# Patient Record
Sex: FEMALE | Race: OTHER | Employment: FULL TIME | URBAN - METROPOLITAN AREA
[De-identification: names, ages, dates, MRNs, and addresses within clinical notes are randomized per-mention and may not be internally consistent; named-entity substitution may affect disease eponyms.]

---

## 2018-05-11 ENCOUNTER — HOSPITAL ENCOUNTER (OUTPATIENT)
Dept: LAB | Age: 38
Discharge: HOME | End: 2018-05-11

## 2018-05-11 ENCOUNTER — OFFICE VISIT (OUTPATIENT)
Dept: FAMILY MEDICINE CLINIC | Age: 38
End: 2018-05-11

## 2018-05-11 VITALS
HEART RATE: 73 BPM | DIASTOLIC BLOOD PRESSURE: 82 MMHG | WEIGHT: 127 LBS | SYSTOLIC BLOOD PRESSURE: 112 MMHG | TEMPERATURE: 98.9 F | HEIGHT: 61 IN | BODY MASS INDEX: 23.98 KG/M2

## 2018-05-11 DIAGNOSIS — N89.8 VAGINAL ITCHING: ICD-10-CM

## 2018-05-11 DIAGNOSIS — R42 DIZZINESS: ICD-10-CM

## 2018-05-11 DIAGNOSIS — Z13.9 ENCOUNTER FOR SCREENING: Primary | ICD-10-CM

## 2018-05-11 LAB
ALBUMIN SERPL-MCNC: 4.1 G/DL (ref 3.5–5)
ALBUMIN/GLOB SERPL: 1.1 {RATIO} (ref 1.1–2.2)
ALP SERPL-CCNC: 75 U/L (ref 45–117)
ALT SERPL-CCNC: 49 U/L (ref 12–78)
ANION GAP SERPL CALC-SCNC: 6 MMOL/L (ref 5–15)
AST SERPL-CCNC: 27 U/L (ref 15–37)
BILIRUB SERPL-MCNC: 0.2 MG/DL (ref 0.2–1)
BUN SERPL-MCNC: 8 MG/DL (ref 6–20)
BUN/CREAT SERPL: 12 (ref 12–20)
CALCIUM SERPL-MCNC: 9.3 MG/DL (ref 8.5–10.1)
CHLORIDE SERPL-SCNC: 108 MMOL/L (ref 97–108)
CO2 SERPL-SCNC: 28 MMOL/L (ref 21–32)
CREAT SERPL-MCNC: 0.69 MG/DL (ref 0.55–1.02)
ERYTHROCYTE [DISTWIDTH] IN BLOOD BY AUTOMATED COUNT: 13 % (ref 11.5–14.5)
EST. AVERAGE GLUCOSE BLD GHB EST-MCNC: 105 MG/DL
GLOBULIN SER CALC-MCNC: 3.7 G/DL (ref 2–4)
GLUCOSE SERPL-MCNC: 84 MG/DL (ref 65–100)
HBA1C MFR BLD: 5.3 % (ref 4.2–6.3)
HCT VFR BLD AUTO: 43 % (ref 35–47)
HGB BLD-MCNC: 13.3 G/DL
HGB BLD-MCNC: 13.6 G/DL (ref 11.5–16)
MCH RBC QN AUTO: 28.4 PG (ref 26–34)
MCHC RBC AUTO-ENTMCNC: 31.6 G/DL (ref 30–36.5)
MCV RBC AUTO: 89.8 FL (ref 80–99)
NRBC # BLD: 0 K/UL (ref 0–0.01)
NRBC BLD-RTO: 0 PER 100 WBC
PLATELET # BLD AUTO: 326 K/UL (ref 150–400)
PMV BLD AUTO: 9 FL (ref 8.9–12.9)
POTASSIUM SERPL-SCNC: 4.4 MMOL/L (ref 3.5–5.1)
PROT SERPL-MCNC: 7.8 G/DL (ref 6.4–8.2)
RBC # BLD AUTO: 4.79 M/UL (ref 3.8–5.2)
SODIUM SERPL-SCNC: 142 MMOL/L (ref 136–145)
T4 FREE SERPL-MCNC: 0.9 NG/DL (ref 0.8–1.5)
TSH SERPL DL<=0.05 MIU/L-ACNC: 2.06 UIU/ML (ref 0.36–3.74)
WBC # BLD AUTO: 3.7 K/UL (ref 3.6–11)

## 2018-05-11 PROCEDURE — 84443 ASSAY THYROID STIM HORMONE: CPT | Performed by: NURSE PRACTITIONER

## 2018-05-11 PROCEDURE — 83036 HEMOGLOBIN GLYCOSYLATED A1C: CPT | Performed by: NURSE PRACTITIONER

## 2018-05-11 PROCEDURE — 84439 ASSAY OF FREE THYROXINE: CPT | Performed by: NURSE PRACTITIONER

## 2018-05-11 PROCEDURE — 85027 COMPLETE CBC AUTOMATED: CPT | Performed by: NURSE PRACTITIONER

## 2018-05-11 PROCEDURE — 80053 COMPREHEN METABOLIC PANEL: CPT | Performed by: NURSE PRACTITIONER

## 2018-05-11 RX ORDER — FLUCONAZOLE 150 MG/1
TABLET ORAL
Qty: 3 TAB | Refills: 0 | Status: SHIPPED | OUTPATIENT
Start: 2018-05-11 | End: 2018-07-02 | Stop reason: ALTCHOICE

## 2018-05-11 NOTE — PATIENT INSTRUCTIONS
Mareos: Instrucciones de cuidado - [ Dizziness: Care Instructions ]  Instrucciones de cuidado  Los mareos son [place] sensación de inestabilidad o confusión en la philip. Son distintos al vértigo, love sensación de que la habitación gira o de que usted se mueve o . También es distinto del aturdimiento, que es la sensación de que está a punto de desmayarse. Puede resultar difícil conocer la causa de los Jack. Algunas personas se sienten mareadas cuando tienen migrañas. A veces, los episodios gripales pueden hacer que se sienta mareado. Algunas afecciones médicas, jose los problemas cardíacos o la presión arterial amy, pueden hacer que se sienta mareado. Muchos medicamentos pueden causar mareos, jose los que se usan para la presión arterial amy, el dolor o la ansiedad. Si es un medicamento el que está causando los síntomas, stack médico podría recomendarle que lo cambie o deje de tomarlo. Si es un problema cardíaco, podría necesitar medicamentos para ayudar a que stack corazón funcione mejor. Si no hay razón aparente para los síntomas, stack médico podría sugerir vigilar y esperar odilia un tiempo para ashli si los mareos desaparecen por sí solos. La atención de seguimiento es love parte clave de stack tratamiento y seguridad. Asegúrese de hacer y acudir a todas las citas, y llame a stack médico si está teniendo problemas. También es love buena idea saber los resultados de los exámenes y mantener love lista de los medicamentos que noelle. ¿Cómo puede cuidarse en el hogar? · Si stack médico le recomienda o receta medicamentos, tómelos exactamente según las indicaciones. Llame a stack médico si elizabeth estar teniendo un problema con stack medicamento. · No conduzca mientras se sienta mareado. · Trate de prevenir las caídas. Algunas medidas que puede ning son:  Felipe Demark Usar tapetes antideslizantes, agregar agarraderas cerca de la rick y usar luces nocturnas.   ¨ Ordenar stack casa de margarito manera que en los senderos no haya nada con lo que se pueda tropezar. ¨ Avisarles a familiares y [facility] que se ha estado sintiendo [place]. [place] les servirá para saber cómo ayudarle. ¿Cuándo debe pedir ayuda? Llame al 911 en cualquier momento que considere que necesita atención de [place]. Por ejemplo, llame si:  ? · Se desmayó (perdió el conocimiento). ? · Tiene mareos junto con síntomas de un ataque cardíaco. Estos pueden incluir:  ¨ Dolor de pecho, o presión o love sensación extraña en el pecho. ¨ Sudoración. ¨ Falta de aire. ¨ Náuseas o vómito. ¨ Dolor, presión, o love sensación extraña en la espalda, el terry, la mandíbula o el abdomen superior, o en pro o ambos hombros o brazos. ¨ Aturdimiento o debilidad repentina. ¨ Un latido cardíaco rápido o irregular. ? · Tiene síntomas de un ataque cerebral. Estos pueden incluir:  ¨ Entumecimiento, hormigueo, debilidad o parálisis repentinos en la jazmin, el brazo o la pierna, sobre todo si ocurre en un solo lado del cuerpo. ¨ Cambios súbitos en la vista. ¨ Problemas repentinos para hablar. ¨ Confusión súbita o dificultad repentina para comprender frases sencillas. ¨ Problemas repentinos para caminar o mantener el equilibrio. ¨ Un dolor de philip intenso y repentino, distinto a los jyotsna de philip anteriores. ?Llame a stack médico ahora mismo o busque atención médica inmediata si:  ? · Se siente mareado y tiene Dionocłmelissa, hedy de Jah o zumbido [employer] oídos. ? · Tiene nuevas náuseas y vómito o [address] ? · Pricila mareos no desaparecen o regresan. ?Preste especial atención a los cambios en stack andrew y asegúrese de comunicarse con stack médico si:  ? · No mejora jose se esperaba. ¿Dónde puede encontrar más información en inglés? Eddie Edward a http://obdulio-yannick.info/. [place] Spotted [id number] en la búsqueda para aprender más acerca de \"Mareos: Instrucciones de cuidado - [ Dizziness: Care Instructions ]. \"  Revisado: 20 Leona Romero 2017  Versión del contenido: 11.4  © 4479-7837 Healthwise, Incorporated.  Robles Trimble instrucciones de cuidado fueron adaptadas bajo licencia por Good [facility] Connections (which disclaims liability or warranty for this information). Si usted tiene [place] afección médica o sobre estas instrucciones, siempre pregunte a stack profesional de andrew. [facility] niega toda garantía o responsabilidad por stack uso de esta información.

## 2018-05-11 NOTE — PROGRESS NOTES
Printed AVS, provided to pt and reviewed. Pt indicated understanding and had no questions. Told pt that rx's have been sent to pharmacy and they should be ready for  in approximately 2 hrs. Reviewed the medication ordered today with the pt. Charla Rosenthal was the . Referred the pt to the labs for blood work. Pt instructed to return to the clinic in 6-8 weeks.  Otf Mary RN

## 2018-05-11 NOTE — PROGRESS NOTES
Subjective:     Chief Complaint   Patient presents with    Dizziness    Skin Problem        She  is a 30 y.o. female who presents for evaluation of dizziness. Onset since birth of son approx 3 months ago. Pt is not currently breastfeeding. Is taking PNV and centrum daily MV.     S&S is intermittent, usually 3-4x week, lasting only a few min and then are gone. No relationship to positioning/activity, though they do get worse w/ bending over. Also c/o of vaginal ext irration w/ some white discharge. Onset approx 1 month ago. Poor relief from OTC vaginal products. prutitus gets worse prior to and during her menses. No BC, surg sterilized since birth of child. Had some prior dizziness before and was told by provider it was r/t her cholesterol. PMH: denies     Surg:  x 1    NKDA     Current Rx/supplements: noted above    Social:  Pt denies tobacco, etoh nor drug use. 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 Pt is not currently  nor has partner. Has 3 other child + infant son. Family Hx:  Denies           ROS  Gen - no fever/chills  Resp - no dyspnea or cough  CV - no chest pain or SALAZAR  Rest per HPI    No past medical history on file. No past surgical history on file. No current outpatient prescriptions on file prior to visit. No current facility-administered medications on file prior to visit.          Objective:     Vitals:    18 1404   BP: 112/82   Pulse: 73   Temp: 98.9 °F (37.2 °C)   TempSrc: Oral   Weight: 127 lb (57.6 kg)   Height: 5' 1.42\" (1.56 m)       Physical Examination:  General appearance - alert, well appearing, and in no distress  Eyes -sclera anicteric  Neck - supple, no significant adenopathy, no thyromegaly  Chest - clear to auscultation, no wheezes, rales or rhonchi, symmetric air entry  Heart - normal rate, regular rhythm, normal S1, S2, no murmurs, rubs, clicks or gallops  Neurological - alert, oriented, no focal findings or movement disorder noted  Abdomen-BS present/WNL x 4 quads, non-tender/distended, soft,no organomegaly    Assessment/ Plan:   Diagnoses and all orders for this visit:    1. Encounter for screening  -     AMB POC HEMOGLOBIN (HGB)    2. Vaginal itching  -     fluconazole (DIFLUCAN) 150 mg tablet; Take 1 tablet q3 days x 9 days. [place] 1 tableta cada 3 purvis por 9 purvis. 3. Dizziness  -     METABOLIC PANEL, COMPREHENSIVE; Future  -     HEMOGLOBIN A1C WITH EAG; Future  -     TSH 3RD GENERATION; Future  -     CBC W/O DIFF; Future  -     T4, FREE; Future       Vitals and exam unremarkable, likely r/t excess vitamin intake? Check baseline labs. Advised to stop both OTC vitamins. Diflucan q3 days x 3 doses for suspected candida, no sexual partners since birth of child. Re-eval S&S in 6-8 weeks. I have discussed the diagnosis with the patient and the intended plan as seen in the above orders. The patient has received an after-visit summary and questions were answered concerning future plans. I have discussed medication side effects and warnings with the patient as well. The patient verbalizes understanding and agreement with the plan.     Follow-up Disposition: Not on File

## 2018-07-02 ENCOUNTER — OFFICE VISIT (OUTPATIENT)
Dept: FAMILY MEDICINE CLINIC | Age: 38
End: 2018-07-02

## 2018-07-02 VITALS
BODY MASS INDEX: 23.11 KG/M2 | SYSTOLIC BLOOD PRESSURE: 115 MMHG | HEART RATE: 74 BPM | TEMPERATURE: 98.3 F | DIASTOLIC BLOOD PRESSURE: 80 MMHG | WEIGHT: 124 LBS

## 2018-07-02 DIAGNOSIS — R30.0 DYSURIA: ICD-10-CM

## 2018-07-02 DIAGNOSIS — M54.50 MIDLINE LOW BACK PAIN WITHOUT SCIATICA, UNSPECIFIED CHRONICITY: Primary | ICD-10-CM

## 2018-07-02 RX ORDER — CYCLOBENZAPRINE HCL 10 MG
10 TABLET ORAL
Qty: 30 TAB | Refills: 0 | Status: SHIPPED | OUTPATIENT
Start: 2018-07-02

## 2018-07-02 RX ORDER — DICLOFENAC SODIUM 75 MG/1
75 TABLET, DELAYED RELEASE ORAL
Qty: 40 TAB | Refills: 1 | Status: SHIPPED | OUTPATIENT
Start: 2018-07-02

## 2018-07-02 NOTE — PROGRESS NOTES
Coordination of Care  1. Have you been to the ER, urgent care clinic since your last visit? Hospitalized since your last visit? No    2. Have you seen or consulted any other health care providers outside of the [address] [place] [address] since your last visit? Include any pap smears or colon screening. No    Does the patient need refills? NO    Learning Assessment Complete?  yes

## 2018-07-02 NOTE — PROGRESS NOTES
Subjective:     Chief Complaint   Patient presents with    LOW BACK PAIN     x 3 weeks        She  is a 41 y.o. female who presents for evaluation of LBP x 3 weeks. No acute injury/fall/MVA prior to onset. Recalls waking up in AM w/ pain. No radiation into legs. Bilateral, sometimes more pronounced on L side. Since LOV, dizziness has improved. Reports it is usually worse first thing in AM, improves w/ activity. No attempted remedies/Tx. Has noted some dysuria x 2 weeks. No assoc pelvic pain nor menstrual changes. ROS  Gen - no fever/chills  Resp - no dyspnea or cough  CV - no chest pain or SALAZAR  Rest per HPI    No past medical history on file. No past surgical history on file. Current Outpatient Prescriptions on File Prior to Visit   Medication Sig Dispense Refill    fluconazole (DIFLUCAN) 150 mg tablet Take 1 tablet q3 days x 9 days. [place] 1 tableta cada 3 purvis por 9 purvis. 3 Tab 0     No current facility-administered medications on file prior to visit. Objective:     Vitals:    07/02/18 1334   BP: 115/80   Pulse: 74   Temp: 98.3 °F (36.8 °C)   TempSrc: Oral   Weight: 124 lb (56.2 kg)       Physical Examination:  General appearance - alert, well appearing, and in no distress  Eyes -sclera anicteric  Neck - supple, no significant adenopathy, no thyromegaly  Chest - clear to auscultation, no wheezes, rales or rhonchi, symmetric air entry  Heart - normal rate, regular rhythm, normal S1, S2, no murmurs, rubs, clicks or gallops  Neurological - alert, oriented, no focal findings or movement disorder noted    Neg SI tenderness and SLR, no palpable spinal abnormalities, gait WNL     Assessment/ Plan:   Diagnoses and all orders for this visit:    1. Midline low back pain without sciatica, unspecified chronicity  -     diclofenac EC (VOLTAREN) 75 mg EC tablet; Take 1 Tab by mouth nightly as needed. [place] 1 tableta por boca cada 12 horas por necesidad.   -     cyclobenzaprine (FLEXERIL) 10 mg tablet; Take 1 Tab by mouth nightly as needed for Muscle Spasm(s). [place] 1 tableta por boca cada noche por necisdad para espamos musculares. 2. Dysuria  -     CULTURE, URINE; Future  -     CHLAMYDIA/GC PCR; Future       Recommend Pt attempt trial of Rx nsaid, flexeril and back exercises for the next 4-6 weeks, RTC PRN if no improved by the middle/end of Aug.     Check urine Cx and g/c r/t dysuria. Declined work note. Advised her of emergency S&S warranted ED eval.     RTC PRN. I have discussed the diagnosis with the patient and the intended plan as seen in the above orders. The patient has received an after-visit summary and questions were answered concerning future plans. I have discussed medication side effects and warnings with the patient as well. The patient verbalizes understanding and agreement with the plan.     Follow-up Disposition: Not on File

## 2018-07-02 NOTE — PROGRESS NOTES
Reviewed AVS, prescription and pharmacy location with patient. Pt aware that she should RTC if s/s worsen to fail to improve. Patient verbalized understanding . No questions or concern from patient at this time.  Holden Schwartz RN

## 2018-07-02 NOTE — PATIENT INSTRUCTIONS
Dolor en la parte baja de la espalda (lumbalgia): Ejercicios - [ Low Back Pain: Exercises ]  Instrucciones de cuidado  Aquí se presentan algunos ejemplos de ejercicios típicos de rehabilitación para tratar stack afección. Empiece cada ejercicio lentamente. Reduzca la intensidad del ejercicio si Dierdre Ast a tener dolor. Stack médico o fisioterapeuta le dirán cuándo puede comenzar con estos ejercicios y cuáles funcionarán mejor para usted. Cómo hacer los ejercicios  [place]    1. Acuéstese boca abajo, apoyando stack cuerpo con los antebrazos. 2. Wallace presión con los codos en el piso para elevar la espalda. Al hacer esto, relaje los músculos del estómago y permita que stack espalda se arquee sin usar los músculos de la espalda. Al hacer presión, evite que las caderas o la pelvis se separen del piso. 3. Mantenga la posición de 15 a 30 segundos y luego relájese. 4. Repita de 2 a 4 veces. Ejercicio de alternar brazo y pierna (omid de caza)    1. Nota: Wallace ling ejercicio lentamente. Trate de mantener el cuerpo [employer], y no deje que love cadera caiga más abajo que la [place]. 2. Comience con las chayo y las rodillas en el piso. 3. Contraiga los músculos del abdomen. 4. Eleve love pierna del suelo y manténgala recta detrás de usted. Tenga cuidado de no dejar caer la cadera hacia abajo, porque eso hará que se le tuerza el tronco.  5. Mantenga la posición odilia unos 6 segundos y luego baje la pierna y alberta a la otra pierna. 6. Repita de 8 a 12 veces con cada pierna. 7. Con el tiempo, vaya aumentando [employer] de 10 a 30 segundos cada vez.  8. Si se siente estable y seguro con la pierna elevada, intente levantar el brazo opuesto directamente enfrente de usted al [employer]. Ejercicio de rodillas al pecho    1. Acuéstese boca arriba con las rodillas flexionadas y los pies apoyados sobre el piso.   2. Lleve love [place], manteniendo el otro pie apoyado en el suelo (o dejando la [place] pierna recta, jose lo sienta mejor en la parte baja de la espalda). 3. Mantenga la parte baja de la espalda presionada contra el suelo. Sosténgalo por lo menos de 15 a 30 segundos. 4. Relájese y regrese la rodilla a la posición inicial.  5. Repita el ejercicio con la otra pierna. Repita de 2 a 4 veces con cada pierna. 6. Para lograr mayor estiramiento, deje la otra pierna apoyada en el suelo mientras se asia la rodilla [place]. Abdominales    1. Acuéstese en el suelo boca arriba, con las rodillas dobladas en un ángulo de 90 grados. Los pies deben estar apoyados en el suelo, a unas 12 pulgadas (30 cm) de las nalgas (glúteos). 2. Cruce los [place]. Si esto le causa molestias en el terry, pruebe a poner las chayo detrás del terry (no de la philip), con los codos abiertos. 3. Contraiga lentamente los músculos del abdomen y eleve los omóplatos del suelo. 4. Mantenga la philip alineada con el cuerpo y no presione la barbilla hacia el pecho. 5. Sostenga esta posición por 1 o 2 segundos y después baje lentamente de nuevo hacia el suelo. 6. Repita de 8 a 12 veces. Ejercicio de inclinación de pelvis    1. Acuéstese de espalda con las rodillas flexionadas. 2. \"Tense\" stack estómago. [place] significa apretar los músculos contrayendo el ombligo e imaginando que se mueve hacia la columna vertebral. Deberá sentir jose si la espalda estuviera ejerciendo presión sobre el suelo y que las caderas y la pelvis se balancean hacia atrás. 3. Mantenga la posición odilia aproximadamente 6 segundos mientras respira suavemente. 4. Repita de 8 a 12 veces. [place] con el talón    1. Acuéstese boca arriba con ambas rodillas flexionadas y los tobillos doblados de manera que solo los talones estén sobre el piso. Las rodillas deben estar dobladas más o menos a 90 grados.   2. A continuación mert presión con los talones en el suelo, apriete las nalgas (glúteos) y levante las caderas del suelo hasta que los hombros, las caderas y las rodillas estén en línea recta. 3. Mantenga la posición odilia unos 6 segundos mientras sigue respirando normalmente, y luego baje lentamente las caderas hacia el piso y descanse por hasta 10 segundos. [address] 8 a 12 repeticiones. Estiramiento de isquiotibiales en el katharina de love jeovanny    1. Acuéstese boca arriba en el katharina de [place], con love pierna a través de la [place]. 2. Deslice la pierna hacia arriba por la pared, para enderezar la rodilla. Debe sentir un leve estiramiento en la parte posterior de la pierna. 3. Sostenga el estiramiento por lo menos de 15 a 30 segundos. No arquee la espalda, estire los dedos de los pies ni flexione las rodillas. Mantenga un talón tocando el suelo y el otro tocando la pared. 4. Repita con la otra pierna. 5. Repita de 2 a 4 veces con cada pierna. Estiramiento de los músculos flexores de la cadera    1. Póngase de rodillas en el suelo con love Rigoberto Pioche y Classie Bob atrás. Coloque la rodilla de adelante encima del pie. Mantenga la otra rodilla en contacto con el suelo. 2. Empuje lentamente la cadera hacia adelante hasta que sienta un estiramiento en la parte superior del muslo de la pierna que está atrás. 3. Sostenga el estiramiento por lo menos de 15 a 30 segundos. Repita con la otra pierna. 4. Repita de 2 a 4 veces a cada lado. [place] de pared    1. Párese con la espalda a entre 10 y 12 pulgadas (25 a 30 cm) de distancia de la pared. 2. Inclínese hacia la pared [employer] la espalda quede plana sobre stanislaw. 3. Deslícese lentamente hacia abajo hasta que las rodillas estén ligeramente flexionadas, presionando la parte baja de la espalda contra la pared. 4. Mantenga la posición odilia unos 6 segundos y después deslícese hacia arriba por la pared. 5. Repita de 8 a 12 veces. La atención de seguimiento es love parte clave de stack tratamiento y seguridad.  Asegúrese de hacer y acudir a todas las citas, y llame a stack médico si está teniendo problemas. También es love buena idea saber los resultados de los exámenes y mantener love lista de los medicamentos que noelle. ¿Dónde puede encontrar más información en inglés? Zara Motley a http://obdulio-yannick.info/. [place] Noelle X532 en la búsqueda para aprender más acerca de \"Dolor en la parte baja de la espalda (lumbalgia): Ejercicios - [ Low Back Pain: Exercises ]. \"  Revisado: 21 marzo, 2017  Versión del contenido: 11.4  © 4827-0600 Healthwise, Incorporated. Las instrucciones de cuidado fueron adaptadas bajo licencia por [employer] (which disclaims liability or warranty for this information). Si usted tiene [place] afección médica o sobre estas instrucciones, siempre pregunte a stack profesional de andrew. Healthwise, Incorporated niega toda garantía o responsabilidad por stack uso de esta información.

## 2019-08-27 ENCOUNTER — HOSPITAL ENCOUNTER (EMERGENCY)
Age: 39
Discharge: HOME | End: 2019-08-27
Attending: EMERGENCY MEDICINE
Payer: SELF-PAY

## 2019-08-27 ENCOUNTER — APPOINTMENT (OUTPATIENT)
Dept: GENERAL RADIOLOGY | Age: 39
End: 2019-08-27
Attending: EMERGENCY MEDICINE
Payer: SELF-PAY

## 2019-08-27 VITALS
HEIGHT: 61 IN | RESPIRATION RATE: 16 BRPM | SYSTOLIC BLOOD PRESSURE: 127 MMHG | TEMPERATURE: 98 F | WEIGHT: 125 LBS | DIASTOLIC BLOOD PRESSURE: 83 MMHG | HEART RATE: 67 BPM | OXYGEN SATURATION: 99 % | BODY MASS INDEX: 23.6 KG/M2

## 2019-08-27 DIAGNOSIS — R07.9 CHEST PAIN, UNSPECIFIED TYPE: Primary | ICD-10-CM

## 2019-08-27 DIAGNOSIS — M25.512 LEFT SHOULDER PAIN, UNSPECIFIED CHRONICITY: ICD-10-CM

## 2019-08-27 LAB
ALBUMIN SERPL-MCNC: 4.2 G/DL (ref 3.5–5)
ALBUMIN/GLOB SERPL: 1.1 {RATIO} (ref 1.1–2.2)
ALP SERPL-CCNC: 63 U/L (ref 45–117)
ALT SERPL-CCNC: 34 U/L (ref 12–78)
ANION GAP SERPL CALC-SCNC: 7 MMOL/L (ref 5–15)
APPEARANCE UR: CLEAR
AST SERPL-CCNC: 20 U/L (ref 15–37)
ATRIAL RATE: 70 BPM
BACTERIA URNS QL MICRO: NEGATIVE /HPF
BASOPHILS # BLD: 0 K/UL (ref 0–0.1)
BASOPHILS NFR BLD: 1 % (ref 0–1)
BILIRUB SERPL-MCNC: 0.4 MG/DL (ref 0.2–1)
BILIRUB UR QL: NEGATIVE
BUN SERPL-MCNC: 7 MG/DL (ref 6–20)
BUN/CREAT SERPL: 10 (ref 12–20)
CALCIUM SERPL-MCNC: 9.4 MG/DL (ref 8.5–10.1)
CALCULATED P AXIS, ECG09: 55 DEGREES
CALCULATED R AXIS, ECG10: 56 DEGREES
CALCULATED T AXIS, ECG11: 47 DEGREES
CHLORIDE SERPL-SCNC: 105 MMOL/L (ref 97–108)
CO2 SERPL-SCNC: 27 MMOL/L (ref 21–32)
COLOR UR: ABNORMAL
COMMENT, HOLDF: NORMAL
CREAT SERPL-MCNC: 0.69 MG/DL (ref 0.55–1.02)
DIAGNOSIS, 93000: NORMAL
DIFFERENTIAL METHOD BLD: NORMAL
EOSINOPHIL # BLD: 0.2 K/UL (ref 0–0.4)
EOSINOPHIL NFR BLD: 3 % (ref 0–7)
EPITH CASTS URNS QL MICRO: ABNORMAL /LPF
ERYTHROCYTE [DISTWIDTH] IN BLOOD BY AUTOMATED COUNT: 12.8 % (ref 11.5–14.5)
GLOBULIN SER CALC-MCNC: 3.8 G/DL (ref 2–4)
GLUCOSE SERPL-MCNC: 89 MG/DL (ref 65–100)
GLUCOSE UR STRIP.AUTO-MCNC: NEGATIVE MG/DL
HCG UR QL: NEGATIVE
HCT VFR BLD AUTO: 43.5 % (ref 35–47)
HGB BLD-MCNC: 14 G/DL (ref 11.5–16)
HGB UR QL STRIP: NEGATIVE
IMM GRANULOCYTES # BLD AUTO: 0 K/UL (ref 0–0.04)
IMM GRANULOCYTES NFR BLD AUTO: 0 % (ref 0–0.5)
KETONES UR QL STRIP.AUTO: NEGATIVE MG/DL
LEUKOCYTE ESTERASE UR QL STRIP.AUTO: ABNORMAL
LIPASE SERPL-CCNC: 147 U/L (ref 73–393)
LYMPHOCYTES # BLD: 1.9 K/UL (ref 0.8–3.5)
LYMPHOCYTES NFR BLD: 38 % (ref 12–49)
MCH RBC QN AUTO: 27.8 PG (ref 26–34)
MCHC RBC AUTO-ENTMCNC: 32.2 G/DL (ref 30–36.5)
MCV RBC AUTO: 86.5 FL (ref 80–99)
MONOCYTES # BLD: 0.4 K/UL (ref 0–1)
MONOCYTES NFR BLD: 8 % (ref 5–13)
NEUTS SEG # BLD: 2.4 K/UL (ref 1.8–8)
NEUTS SEG NFR BLD: 50 % (ref 32–75)
NITRITE UR QL STRIP.AUTO: NEGATIVE
NRBC # BLD: 0 K/UL (ref 0–0.01)
NRBC BLD-RTO: 0 PER 100 WBC
P-R INTERVAL, ECG05: 134 MS
PH UR STRIP: 6.5 [PH] (ref 5–8)
PLATELET # BLD AUTO: 272 K/UL (ref 150–400)
PMV BLD AUTO: 8.9 FL (ref 8.9–12.9)
POTASSIUM SERPL-SCNC: 4.2 MMOL/L (ref 3.5–5.1)
PROT SERPL-MCNC: 8 G/DL (ref 6.4–8.2)
PROT UR STRIP-MCNC: NEGATIVE MG/DL
Q-T INTERVAL, ECG07: 400 MS
QRS DURATION, ECG06: 76 MS
QTC CALCULATION (BEZET), ECG08: 432 MS
RBC # BLD AUTO: 5.03 M/UL (ref 3.8–5.2)
RBC #/AREA URNS HPF: ABNORMAL /HPF (ref 0–5)
SAMPLES BEING HELD,HOLD: NORMAL
SODIUM SERPL-SCNC: 139 MMOL/L (ref 136–145)
SP GR UR REFRACTOMETRY: <1.005 (ref 1–1.03)
TROPONIN I SERPL-MCNC: <0.05 NG/ML
UR CULT HOLD, URHOLD: NORMAL
UROBILINOGEN UR QL STRIP.AUTO: 0.2 EU/DL (ref 0.2–1)
VENTRICULAR RATE, ECG03: 70 BPM
WBC # BLD AUTO: 4.9 K/UL (ref 3.6–11)
WBC URNS QL MICRO: ABNORMAL /HPF (ref 0–4)

## 2019-08-27 PROCEDURE — 84484 ASSAY OF TROPONIN QUANT: CPT

## 2019-08-27 PROCEDURE — 99284 EMERGENCY DEPT VISIT MOD MDM: CPT

## 2019-08-27 PROCEDURE — 96375 TX/PRO/DX INJ NEW DRUG ADDON: CPT

## 2019-08-27 PROCEDURE — 71046 X-RAY EXAM CHEST 2 VIEWS: CPT

## 2019-08-27 PROCEDURE — 36415 COLL VENOUS BLD VENIPUNCTURE: CPT

## 2019-08-27 PROCEDURE — 80053 COMPREHEN METABOLIC PANEL: CPT

## 2019-08-27 PROCEDURE — 83690 ASSAY OF LIPASE: CPT

## 2019-08-27 PROCEDURE — 81025 URINE PREGNANCY TEST: CPT

## 2019-08-27 PROCEDURE — 74011000250 HC RX REV CODE- 250: Performed by: EMERGENCY MEDICINE

## 2019-08-27 PROCEDURE — 85025 COMPLETE CBC W/AUTO DIFF WBC: CPT

## 2019-08-27 PROCEDURE — 74011250636 HC RX REV CODE- 250/636: Performed by: EMERGENCY MEDICINE

## 2019-08-27 PROCEDURE — 96374 THER/PROPH/DIAG INJ IV PUSH: CPT

## 2019-08-27 PROCEDURE — 73030 X-RAY EXAM OF SHOULDER: CPT

## 2019-08-27 PROCEDURE — 81001 URINALYSIS AUTO W/SCOPE: CPT

## 2019-08-27 PROCEDURE — 93005 ELECTROCARDIOGRAM TRACING: CPT

## 2019-08-27 RX ORDER — KETOROLAC TROMETHAMINE 30 MG/ML
30 INJECTION, SOLUTION INTRAMUSCULAR; INTRAVENOUS
Status: COMPLETED | OUTPATIENT
Start: 2019-08-27 | End: 2019-08-27

## 2019-08-27 RX ORDER — IBUPROFEN 600 MG/1
600 TABLET ORAL
Qty: 20 TAB | Refills: 0 | Status: SHIPPED | OUTPATIENT
Start: 2019-08-27

## 2019-08-27 RX ORDER — OMEPRAZOLE 20 MG/1
20 TABLET, DELAYED RELEASE ORAL DAILY
Qty: 20 TAB | Refills: 0 | Status: SHIPPED | OUTPATIENT
Start: 2019-08-27

## 2019-08-27 RX ADMIN — FAMOTIDINE 20 MG: 10 INJECTION, SOLUTION INTRAVENOUS at 11:42

## 2019-08-27 RX ADMIN — KETOROLAC TROMETHAMINE 30 MG: 30 INJECTION, SOLUTION INTRAMUSCULAR at 11:42

## 2019-08-27 NOTE — ED TRIAGE NOTES
Pt presents to the ED with Chest pain on her left side, pt states it radiates down her left arm. Denies injury or trauma. Denies SOB.  Pt denies cardiac history

## 2019-08-27 NOTE — DISCHARGE INSTRUCTIONS
Patient Education        Dolor de pecho musculoesquelético: Instrucciones de cuidado - [ Musculoskeletal Chest Pain: Care Instructions ]  Instrucciones de cuidado    El dolor de pecho no siempre es love señal de que haya algo nadja con shanks corazón, o de que tenga algún otro problema grave de andrew. Shanks médico piensa que shanks dolor de pecho es causado por músculos o ligamentos forzados, inflamación del cartílago del pecho, o algún otro problema en el pecho y no en el corazón. Es posible que necesite más pruebas para determinar la causa del dolor de [place]. La atención de seguimiento es love parte clave de shanks tratamiento y seguridad. Asegúrese de hacer y acudir a todas las citas, y llame a shanks médico si está teniendo problemas. También es love buena idea saber los resultados de taurus exámenes y mantener love lista de los medicamentos que noelle. ¿Cómo puede cuidarse en el hogar? · [employer] analgésicos (medicamentos para el dolor) exactamente jose le fueron indicados. ? Si el médico le recetó un analgésico, tómelo según las indicaciones. ? Si no está tomando un analgésico recetado, pregúntele a shanks médico si puede ning pro de [employer]. · Descanse y proteja la yuko adolorida. · Interrumpa, modifique o suspenda cualquier actividad que pudiera estar causándole el dolor. · Colóquese hielo o love compresa fría en la yuko adolorida odilia 10 a 20 minutos cada vez. Trate de hacerlo cada 1 a 2 horas odilia los siguientes 3 días (cuando esté despierto) o hasta que la hinchazón baje. Póngase un paño hernandez entre el hielo y la piel. · Después de 2 ó 3 días, aplíquese love toalla tibia o love almohadilla térmica a baja temperatura en la yuko adolorida. Algunos médicos sugieren que se alterne entre tratamientos con calor y frío. · No se envuelva ni se vende con cinta las costillas para sostenerlas. [place] podría hacer que usted mert respiraciones más cortas, lo que podría aumentar shanks riesgo de [employer].   · Cordell Mendezam mentoladas, jose [address] o [address] [place], podrían aliviar los músculos adoloridos. Siga las instrucciones del envase. · Siga las instrucciones de stack médico sobre el ejercicio. · El estiramiento y el masaje suaves podrían ayudarle a mejorarse más rápidamente. Estírese despacio hasta el punto antes de que comience el dolor y mantenga el estiramiento odilia al menos 15 a 30 segundos. Wallace esto 3 ó 4 veces al día. Wallace estiramientos después de haberse aplicado calor. · A medida que stack dolor mejore, vuelva poco a poco a taurus actividades normales. Si el dolor Nidia, podría ser love señal de que necesita descansar odilia [place]. ¿Cuándo debe pedir ayuda? Llame al 911 en cualquier momento que considere que necesita atención de emergencia. Por ejemplo, llame si:    · Siente dolor u opresión en el pecho. Estos síntomas podrían estar acompañados de:  ? Sudoración. ? Falta de aire. ? Náuseas o vómito. ? Dolor que se extiende del pecho al terry, la Ann, o hacia pro o ambos hombros o [place] ? Augusta Salmons. ? Pulso rápido o irregular. Después de llamar al 911, mastique 1 aspirina para adultos. Espere love ambulancia. No trate de conducir usted mismo un automóvil.     · Tiene dolor repentino en el pecho y falta de aire, o tose devang.    Llame a stack médico ahora mismo o busque atención médica inmediata si:    · Tiene cualquier dificultad para respirar.     · El dolor en el pecho empeora.     · Stack dolor de pecho aparece constantemente con el ejercicio y se bi con el reposo.    Preste especial atención a los cambios en stack andrew y asegúrese de comunicarse con stack médico si:    · Stack dolor de pecho no mejora después de 1 semana. ¿Dónde puede encontrar más información en inglés? Mirian Boudreaux a http://obdulio-yannick.[email or web address]   Abby S833 en la búsqueda para aprender más acerca de \"Dolor de pecho musculoesquelético: Instrucciones de cuidado - [ Musculoskeletal Chest Pain: Care Instructions ].\"  Revisado: 23 septiembre, 2018  Versión del contenido: 12.1  © 8132-0920 Healthwise, Incorporated. Las instrucciones de cuidado fueron adaptadas bajo licencia por [facility] [employer] (which disclaims liability or warranty for this information). Si usted tiene [place] afección médica o sobre estas instrucciones, siempre pregunte a stack profesional de andrew. Healthwise, Incorporated niega toda garantía o responsabilidad por stack uso de esta información. Patient Education        Dolor en el hombro: Instrucciones de cuidado - [ Shoulder Pain: Care Instructions ]  Instrucciones de cuidado    Puede lesionar stack hombro al usarlo demasiado odilia [place], jose pescar o jugar béisbol. Puede suceder jose parte del desgaste cotidiano por el envejecimiento. Las lesiones de hombro pueden tardar tiempo en sanar, sneha stack hombro debería mejorar con [employer]. Stack médico podría recomendar un cabestrillo para descansar el hombro. Si se lesionó el hombro, margarito vez necesite pruebas y [place]. La atención de seguimiento es love parte clave de stack tratamiento y seguridad. Asegúrese de hacer y acudir a todas las citas, y llame a stack médico si está teniendo problemas. También es love buena idea saber los resultados de taurus exámenes y mantener love lista de los medicamentos que noelle. ¿Cómo puede cuidarse en el hogar? · [employer] analgésicos (medicamentos para el dolor) exactamente según las indicaciones. ? Si el médico le recetó analgésicos, tómelos según las indicaciones. ? Si no está tomando un analgésico recetado, pregúntele a stack médico si puede ning pro de [employer]. ? No tome dos o más analgésicos al [facility], a menos que el médico se lo haya indicado. Muchos analgésicos contienen acetaminofén, es decir, Tylenol. El exceso de acetaminofén (Tylenol) puede ser dañino. · Si stack médico le recomienda usar un cabestrillo, úselo jose se le haya indicado.  No se lo quite antes de que se lo indique el médico.  · Aplíquese hielo o love compresa fría sobre la yuko adolorida odilia 10 a 20 minutos cada vez. Póngase un paño hernandez entre el hielo y la piel. · Si no hay hinchazón, puede aplicar calor húmedo, love almohadilla térmica o un paño tibio sobre el hombro. Algunos médicos sugieren alternar W. RVioleta Carrera y hollis. · Descanse el hombro odilia algunos días. Si shanks médico lo recomienda, puede comenzar a ejercitar el hombro con suavidad, sneha no levante nada pesado. ¿Cuándo debe pedir ayuda? Llame al 911 en cualquier momento que considere que necesita atención de [place]. Por ejemplo, llame si:    · Siente dolor u opresión en el pecho. [place] podría ocurrir junto con:  ? Sudoración. ? Falta de aire. ? Náuseas o vómito. ? Dolor que se extiende del pecho al terry, la Ann, o hacia pro o ambos hombros o [place]. ? Reese Du. ? Pulso rápido o irregular. Después de llamar al 911, mastique 1 aspirina para adultos. Espere a la ambulancia. No trate de conducir usted mismo un automóvil.     · Shanks brazo o mano está frío o pálido, o cambia de color.    Llame a shanks médico ahora mismo o busque atención médica inmediata si:    · Tiene señales de infección, tales jose:  ? Mayor dolor, hinchazón, enrojecimiento o aumento de la temperatura en el hombro. ? Vetas rojizas que comienzan en love yuko del hombro. ? Pus que supura de love yuko del hombro. ? Ganglios linfáticos inflamados en el terry, las axilas o la omari. ? [place] especial atención a los cambios en shanks andrew y asegúrese de comunicarse con shanks médico si:    · No puede usar el hombro.     · El hombro no mejora jose se esperaba. ¿Dónde puede encontrar más información en inglés? Elizabeth Amador a http://obdulio-yannick.info/. Sandhya Wadsworth D1Cristiane en la búsqueda para aprender más acerca de \"Dolor en el hombro: Instrucciones de cuidado - [ Shoulder Pain: Care Instructions ]. \"  Revisado: 20 septiembre, 2018  Versión del contenido: 12.1  © 4860-5915 Healthwise, [employer]. Las instrucciones de cuidado fueron adaptadas bajo licencia por [facility] (which disclaims liability or warranty for this information). Si usted tiene [place] afección médica o sobre estas instrucciones, siempre pregunte a stack profesional de andrew. [employer] niega toda garantía o responsabilidad por stack uso de esta información.

## 2019-08-27 NOTE — LETTER
NOTIFICATION RETURN TO WORK / SCHOOL 
 
8/27/2019 12:18 PM 
 
Ms. Orlando Blount [facility] [id number] To Whom It May Concern: 
 
Elodia Briggs is currently under the care of [facility] [address] [place] [address]. She will return to work/school on: 8/30/19 If there are questions or concerns please have the patient contact our office. Sincerely, Rudolph Mejia NP

## 2019-08-27 NOTE — ED TRIAGE NOTES
Pt states that she woke with left chest pain with radiation into her left shoulder and arm for the past 2 days. She states that she had some SOB at rest. Pt states that when she is working she develops palpitations with increased pain.

## 2019-08-27 NOTE — ED PROVIDER NOTES
HPI patient is a 61-year-old  female with chief complaint of intermittent chest pain and left shoulder pain for 2 days. She works for as a  and does repetitive pushing pulling lifting;  she is right-handed. Denies fever, cough, cold symptoms,headache, neck pain, visual changes, focal weakness or rash. Denies any difficulty breathing, difficulty swallowing, SOB or abdominal pain. Denies any nausea, vomiting or diarrhea. Pt. Reports that she has not had any pain medications today prior to arrival.        No past medical history on file. No past surgical history on file. No family history on file.     Social History     Socioeconomic History    Marital status: Not on file     Spouse name: Not on file    Number of children: Not on file    Years of education: Not on file    Highest education level: Not on file   Occupational History    Not on file   Social Needs    Financial resource strain: Not on file    Food insecurity:     Worry: Not on file     Inability: Not on file    Transportation needs:     Medical: Not on file     Non-medical: Not on file   Tobacco Use    Smoking status: Never Smoker    Smokeless tobacco: Never Used   Substance and Sexual Activity    Alcohol use: Never     Frequency: Never    Drug use: Never    Sexual activity: Not on file   Lifestyle    Physical activity:     Days per week: Not on file     Minutes per session: Not on file    Stress: Not on file   Relationships    Social connections:     Talks on phone: Not on file     Gets together: Not on file     Attends Congregation service: Not on file     Active member of club or organization: Not on file     Attends meetings of clubs or organizations: Not on file     Relationship status: Not on file    Intimate partner violence:     Fear of current or ex partner: Not on file     Emotionally abused: Not on file     Physically abused: Not on file     Forced sexual activity: Not on file   Other Topics Concern    Not on file   Social History Narrative    Not on file         ALLERGIES: Patient has no known allergies. Review of Systems   Constitutional: Negative for activity change, appetite change, fever and unexpected weight change. HENT: Negative for congestion, sore throat and trouble swallowing. Eyes: Negative for visual disturbance. Respiratory: Positive for cough. Negative for shortness of breath. Cardiovascular: Positive for chest pain. Negative for palpitations and leg swelling. Gastrointestinal: Negative for abdominal pain, nausea and vomiting. Genitourinary: Negative for dysuria and flank pain. Musculoskeletal: Positive for arthralgias and myalgias. Negative for back pain. Skin: Negative for rash. Neurological: Negative for dizziness, weakness and headaches. All other systems reviewed and are negative. Vitals:    08/27/19 1103 08/27/19 1119   BP:  127/83   Pulse: 74 67   Resp:  16   Temp:  98 °F (36.7 °C)   SpO2: 100% 99%   Weight:  56.7 kg (125 lb)   Height:  5' 1\" (1.549 m)            Physical Exam   Constitutional: She is oriented to person, place, and time. She appears well-developed and well-nourished.  female; non smoker; works for a [employer]:   Head: Normocephalic. Neck: Normal range of motion. Neck supple. Cardiovascular: Normal rate and regular rhythm. Pulmonary/Chest: Effort normal and breath sounds normal. She has no decreased breath sounds. Musculoskeletal: Normal range of motion. Right lower leg: Normal.        Left lower leg: Normal.   Lymphadenopathy:     She has no cervical adenopathy. Neurological: She is alert and oriented to person, place, and time. Skin: Skin is warm and dry. No rash noted. Psychiatric: She has a normal mood and affect. Her behavior is normal.   Nursing note and vitals reviewed. MDM       Procedures          EKG reveals normal sinus rhythm with a ventricular rate of 70; without ectopy.   Reviewed by Dr. Justine Arguello. Patient has been reexamined and denies any complaints of pain or discomfort; suspect musculoskeletal strain. Encourage close follow-up with her doctor if symptoms do not improve. 6:08 PM  Patient's results and plan of care have been reviewed with her. Patient and/or family have verbally conveyed their understanding and agreement of the patient's signs, symptoms, diagnosis, treatment and prognosis and additionally agree to follow up as recommended or return to the Emergency Room should her condition change prior to follow-up. Discharge instructions have also been provided to the patient with some educational information regarding her diagnosis as well a list of reasons why she would want to return to the ER prior to her follow-up appointment should her condition change. Graham Caballero NP

## 2020-01-16 ENCOUNTER — OFFICE VISIT (OUTPATIENT)
Dept: FAMILY MEDICINE CLINIC | Age: 40
End: 2020-01-16

## 2020-01-16 ENCOUNTER — HOSPITAL ENCOUNTER (OUTPATIENT)
Dept: LAB | Age: 40
Discharge: HOME | End: 2020-01-16

## 2020-01-16 VITALS
OXYGEN SATURATION: 97 % | SYSTOLIC BLOOD PRESSURE: 120 MMHG | DIASTOLIC BLOOD PRESSURE: 78 MMHG | HEART RATE: 89 BPM | TEMPERATURE: 98.5 F

## 2020-01-16 DIAGNOSIS — Z13.9 ENCOUNTER FOR SCREENING: Primary | ICD-10-CM

## 2020-01-16 DIAGNOSIS — N73.0 PID (ACUTE PELVIC INFLAMMATORY DISEASE): ICD-10-CM

## 2020-01-16 DIAGNOSIS — K59.00 CONSTIPATION, UNSPECIFIED CONSTIPATION TYPE: ICD-10-CM

## 2020-01-16 LAB
BILIRUB UR QL STRIP: NEGATIVE
GLUCOSE UR-MCNC: NEGATIVE MG/DL
KETONES P FAST UR STRIP-MCNC: NEGATIVE MG/DL
PH UR STRIP: 6 [PH] (ref 4.6–8)
PROT UR QL STRIP: NEGATIVE
SP GR UR STRIP: 1.01 (ref 1–1.03)
UA UROBILINOGEN AMB POC: NORMAL (ref 0.2–1)
URINALYSIS CLARITY POC: CLEAR
URINALYSIS COLOR POC: YELLOW
URINE BLOOD POC: NEGATIVE
URINE LEUKOCYTES POC: NEGATIVE
URINE NITRITES POC: NEGATIVE

## 2020-01-16 PROCEDURE — 87491 CHLMYD TRACH DNA AMP PROBE: CPT

## 2020-01-16 RX ORDER — CEFTRIAXONE 250 MG/8ML
250 INJECTION, POWDER, FOR SOLUTION INTRAMUSCULAR; INTRAVENOUS ONCE
Qty: 250 MG | Refills: 0
Start: 2020-01-16 | End: 2020-01-16

## 2020-01-16 RX ORDER — DOXYCYCLINE 100 MG/1
100 TABLET ORAL 2 TIMES DAILY
Qty: 20 TAB | Refills: 0 | Status: SHIPPED | OUTPATIENT
Start: 2020-01-16 | End: 2020-01-26

## 2020-01-16 RX ORDER — DOCUSATE SODIUM 100 MG/1
100 CAPSULE, LIQUID FILLED ORAL 2 TIMES DAILY
Qty: 60 CAP | Refills: 2 | Status: SHIPPED | OUTPATIENT
Start: 2020-01-16 | End: 2020-04-15

## 2020-01-16 NOTE — PROGRESS NOTES
Coordination of Care  1. Have you been to the ER, urgent care clinic since your last visit? Hospitalized since your last visit? Yes When: [facility] 11/2019 head and neck pain     2. Have you seen or consulted any other health care providers outside of the [address] [place] [address] since your last visit? Include any pap smears or colon screening. No    Does the patient need refills?  NO    Learning Assessment Complete? yes    Results for orders placed or performed in visit on 01/16/20   AMB POC URINALYSIS DIP STICK AUTO W/O MICRO   Result Value Ref Range    Color (UA POC) Yellow     Clarity (UA POC) Clear     Glucose (UA POC) Negative Negative    Bilirubin (UA POC) Negative Negative    Ketones (UA POC) Negative Negative    Specific gravity (UA POC) 1.010 1.001 - 1.035    Blood (UA POC) Negative Negative    pH (UA POC) 6 4.6 - 8.0    Protein (UA POC) Negative Negative    Urobilinogen (UA POC) 0.2 mg/dL 0.2 - 1    Nitrites (UA POC) Negative Negative    Leukocyte esterase (UA POC) Negative Negative

## 2020-01-16 NOTE — PROGRESS NOTES
Assessment/Plan:    Diagnoses and all orders for this visit:    1. Encounter for screening  -     AMB POC URINALYSIS DIP STICK AUTO W/O MICRO    2. PID (acute pelvic inflammatory disease)  -     cefTRIAXone (ROCEPHIN) 250 mg injection; 250 mg by IntraMUSCular route once for 1 dose. -     CEFTRIAXONE SODIUM INJECTION  MG  -     [place] THER/PROPH/DIAG INJECTION, SUBCUT/IM  -     doxycycline (ADOXA) 100 mg tablet; Take 1 Tab by mouth two (2) times a day for 10 days. -     CHLAMYDIA / GC-AMPLIFIED    3. Constipation, unspecified constipation type  -     docusate sodium (COLACE) 100 mg capsule; Take 1 Cap by mouth two (2) times a day for 90 days. RYLAN Mitchell expressed understanding of this plan. An AVS was printed and given to the patient.      ----------------------------------------------------------------------    Chief Complaint   Patient presents with    Abdominal Pain     lower abdominal pain radiation to the left     Vaginal Discharge      yellowish discharge with odor x 1 month       History of Present Illness:    43 yo here for eval of pain with intercourse last week, foul smelling vaginal discharge and LLQ pain and hard stool. She recently reunited with the father of her children after a 2 year split up. She had a full work up for STI about one month ago at the  and all testing was normal. Then, she had intercourse with him last week and it was very painful. The pain persists. She had a BTL and had a normal pregnancy this month. She has been having some LLQ mild pain and her bowels are hard and sometimes she has diarrhea. She has a long family hx of \"constipation problems\". She states that she is not too happy about getting back together with her ex but that their children are very happy (range in age from 2 to 13). She is at NO risk of Dv she states. He currently lives in another city     No past medical history on file.     Current Outpatient Medications   Medication Sig Dispense Refill    cefTRIAXone (ROCEPHIN) 250 mg injection 250 mg by IntraMUSCular route once for 1 dose. 250 mg 0    doxycycline (ADOXA) 100 mg tablet Take 1 Tab by mouth two (2) times a day for 10 days. 20 Tab 0    docusate sodium (COLACE) 100 mg capsule Take 1 Cap by mouth two (2) times a day for 90 days. 60 Cap 2    diclofenac EC (VOLTAREN) 75 mg EC tablet Take 1 Tab by mouth nightly as needed. [place] 1 tableta por boca cada 12 horas por necesidad. 40 Tab 1    cyclobenzaprine (FLEXERIL) 10 mg tablet Take 1 Tab by mouth nightly as needed for Muscle Spasm(s). [place] 1 tableta por boca cada noche por necisdad para espamos musculares. 30 Tab 0       No Known Allergies    Social History     Tobacco Use    Smoking status: Never Smoker    Smokeless tobacco: Never Used   Substance Use Topics    Alcohol use: No    Drug use: No       No family history on file.     Physical Exam:     Visit Vitals  /78 (BP 1 Location: Left arm, BP Patient Position: Sitting)   Pulse 89   Temp 98.5 °F (36.9 °C) (Oral)   LMP 01/07/2020   SpO2 97%       A&Ox3  WDWN NAD  Respirations normal and non labored  abd- flat, no masses, mild tendernes LLQ only  Bimanual- CMT present, uterus is not enlarged

## 2020-01-16 NOTE — PROGRESS NOTES
At discharge station AVS was printed and reviewed with pt with Cristina Ryan as . Pt given [facility]  card today  and coupons for prescriptions. Pt given injection of 250 mg of Ceftriaxone mixed with 1% lidocaine in Right gluteal area while lying down on exam table on abdomen. Pt tolerated well. Pt instructed to wait 20 minutes before leaving and agreed to do so.  Khai Siddiqui RN

## 2020-01-16 NOTE — PATIENT INSTRUCTIONS
Enfermedad inflamatoria pélvica: Instrucciones de cuidado - [ Pelvic Inflammatory Disease: Care Instructions ]  Instrucciones de cuidado    La enfermedad inflamatoria pélvica, o EIP, es love infección de las trompas de [place] y otros órganos reproductores de la ronald. La EIP, por lo general, es causada por love infección de transmisión sexual (STI, por taurus siglas en inglés), jose la gonorrea o la clamidia. La EIP puede causar cicatrices en las trompas de [place], lo que hace difícil que love ronald quede [place]. Tener love STI aumenta stack riesgo de otras STI, tales jose herpes genital, verrugas genitales, sífilis y [address]. Es importante ning todo el medicamento que le fue recetado. La EIP puede causar graves problemas de andrew si usted no completa stack tratamiento. La atención de seguimiento es love parte clave de stack tratamiento y seguridad. Asegúrese de hacer y acudir a todas las citas, y llame a stack médico si está teniendo problemas. También es love buena idea saber los resultados de taurus exámenes y mantener love lista de los medicamentos que noelle. ¿Cómo puede cuidarse en el hogar? · [address] No deje de tomarlos por el hecho de sentirse mejor. Debe ning todos los antibióticos hasta terminarlos. · Descanse hasta que la fiebre y el dolor hayan herminia. · [employer] analgésicos (medicamentos para el dolor) exactamente según las indicaciones. ? Si el médico le recetó un analgésico, tómelo según las indicaciones. ? Si no está tomando un analgésico recetado, pregúntele a stack médico si puede ning pro de [employer]. · Póngase love almohadilla térmica (ajustada a baja temperatura) o love bolsa de [place] sobre el vientre para el dolor. · No use lavados vaginales. · No tenga relaciones sexuales ni use tampones (puede usar toallas sanitarias en vez de los tampones) hasta que haya tomado todos taurus medicamentos, el dolor haya desaparecido y se sienta mehran por completo.   · Hable con todas las personas con las que haya tenido relaciones sexuales en los últimos 2 meses. Tienen que [employer] pruebas y margarito vez deban ser tratados por STI. Cómo prevenir las STI  · Use condones de látex cada vez que tenga relaciones sexuales. Úselos desde el inicio hasta el final del contacto sexual.  · Hable con stack lisbeth antes de tener relaciones sexuales. [place] si stack lisbeth tiene alguna infección de transmisión sexual (STI, por taurus siglas en inglés) o si presenta riesgo de tenerla. Recuerde que love persona puede transmitir love STI aun cuando no tenga síntomas. · No tenga relaciones sexuales con ninguna persona que tenga síntomas de love STI, tales jose llagas en los genitales o la boca. · Tener love vern lisbeth sexual (que no tenga STI ni relaciones sexuales con otras personas) es love manera buena de evitar las STI. ¿Cuándo debes pedir ayuda? Llama a tu médico ahora mismo o busca atención médica inmediata si:    · Vuelves a tener fiebre o tienes fiebre más amy.     · Tu dolor empeora.     · Piensas que puedes estar embarazada.    Presta especial atención a los cambios en tu andrew y asegúrate de comunicarte con tu médico si:    · Vomitas o tienes diarrea.     · No mejoras después de 2 días. ¿Dónde puede encontrar más información en inglés? Jeromy Shannon a [email or web address] Escriba N294 en la búsqueda para aprender más acerca de \"Enfermedad inflamatoria pélvica: Instrucciones de cuidado - [ Pelvic Inflammatory Disease: Care Instructions ]. \"  Revisado: 19 febrero, 2019  Versión del contenido: 12.2  © 0130-6746 [employer], [employer]. Las instrucciones de cuidado fueron adaptadas bajo licencia por [employer] (which disclaims liability or warranty for this information). Si usted tiene [place] afección médica o sobre estas instrucciones, siempre pregunte a stack profesional de andrew.  [employer] niega toda garantía o responsabilidad por stack uso de esta información. Estreñimiento: Instrucciones de cuidado - [ Constipation: Care Instructions ]  Instrucciones de cuidado    Tener estreñimiento significa que usted tiene dificultades para eliminar las heces (evacuaciones del intestino). Las personas eliminan heces entre 3 veces al día y [facility] vez cada 3 días. Lo que es normal para usted puede ser [employer]. El estreñimiento puede ocurrir con dolor en el recto y cólicos. El dolor podría empeorar cuando trata de eliminar las heces. A veces hay pequeñas cantidades de devang amy viva en el papel higiénico o en la superficie de las heces. [place] se debe a las venas dilatadas cerca del recto (hemorroides). Algunos cambios en stack Cathlean Manan y estilo de allie podrían ayudarle a evitar el estreñimiento continuo. Es posible que el médico además le recete medicamentos para ayudar a aflojar las heces. Algunos medicamentos pueden causar estreñimiento. [place] incluye los analgésicos (medicamentos para el dolor) y los antidepresivos. Infórmele a stack médico sobre Sisi Baeza que usted noelle. Es posible que stack médico quiera cambiar un medicamento para aliviar taurus síntomas. La atención de seguimiento es love parte clave de stack tratamiento y seguridad. Asegúrese de hacer y acudir a todas las citas, y llame a stack médico si está teniendo problemas. También es love buena idea saber los resultados de taurus exámenes y mantener love lista de los medicamentos que noelle. ¿Cómo puede cuidarse en el hogar? · Claire abundantes líquidos, los suficientes jose para que stack orina sea de color amarillo antwon o transparente jose el agua. Si tiene [employer], del corazón o del hígado y tiene que Jonn's líquidos, hable con stack médico antes de aumentar stack consumo. · Incluya en stack dieta diaria alimentos ricos en fibra. Estos incluyen frutas, verduras, frijoles (habichuelas) y granos integrales. · [place] por lo menos 30 minutos de ejercicio la mayoría de los días de la [place].  Caminar es love buena opción. Es posible que también quiera hacer otras actividades, jose correr, nadar, [employer], o jugar al tenis u otros deportes de equipo. · [place] un suplemento de [employer], jose [employer] o Metamucil, todos los GRASSE. Gabriella y siga todas las indicaciones de la [place]. · Programe tiempo todos los días para evacuar el intestino. Lily Brow rutina diaria podría ayudar. Tómese stack tiempo para evacuar el intestino. · Apoye los pies sobre un banco o taburete pequeño cuando se siente en el inodoro. [place] ayuda a flexionar las caderas y coloca la pelvis en posición de cuclillas. · Stack médico podría recomendarle un laxante de venta crystal para aliviar el estreñimiento. Garold Bk son Macel Donie de Magnesia (Milk of Magnesia) y [place]. Gabriella y siga todas las instrucciones de la [place]. No use laxantes de [employer]. ¿Cuándo debe pedir ayuda? Llame a stack médico ahora mismo o busque atención médica inmediata si:    · Tiene dolor abdominal nuevo o peor.     · Tiene náuseas o vómito nuevos o peores.     · Tiene devang en las heces.    Preste especial atención a los cambios en stack andrew y asegúrese de comunicarse con stack médico si:    · Stack estreñimiento empeora.     · No mejora jose se esperaba. ¿Dónde puede encontrar más información en inglés? Fany Cali a http://serg.info/. Escriba P343 en la búsqueda para aprender Loretta Torres de \"Estreñimiento: Instrucciones de cuidado - [ Constipation: Care Instructions ]. \"  Revisado: 26 cee, 2019  Versión del contenido: 12.2  © 0917-5246 [employer], [place]. Las instrucciones de cuidado fueron adaptadas bajo licencia por [employer] (which disclaims liability or warranty for this information). Si usted tiene Gerlaw Webster afección médica o sobre estas instrucciones, siempre pregunte a stack profesional de andrew. [employer], [place] niega toda garantía o responsabilidad por stack uso de esta información.

## 2020-01-17 LAB
C TRACH DNA SPEC QL NAA+PROBE: NEGATIVE
N GONORRHOEA DNA SPEC QL NAA+PROBE: NEGATIVE
SAMPLE TYPE: NORMAL
SERVICE CMNT-IMP: NORMAL
SPECIMEN SOURCE: NORMAL

## 2020-04-21 ENCOUNTER — TELEPHONE (OUTPATIENT)
Dept: FAMILY MEDICINE CLINIC | Age: 40
End: 2020-04-21

## 2020-04-21 NOTE — TELEPHONE ENCOUNTER
Called patient to perform a wellness check with the assistance of  Phyllis Nielsen. Pt stated that she had no questions or concerns at this time; given [address] appointment line number, hours of operation, and informed of capability to do telephone/virtual visits if she had any other questions/concerns. Pt verbalized understanding. Gracia Cristina.  [place], [facility]